# Patient Record
(demographics unavailable — no encounter records)

---

## 2025-04-16 NOTE — IMAGING
[No spinal deformity, fracture, lytic lesion, or marked single level collapse] : No spinal deformity, fracture, lytic lesion, or marked single level collapse [de-identified] : L lumbar paraspinal muscle tenderness L lumbar paraspinal muscle spasm   pain with flexion minimal pain with extension full ROM with mild stiffness diminished ROM in all planes   motor exam 5/5 strength bilaterally sensory intact +L SLR L L4/5 dermatome of pain   ambulates without assistive device non antalgic gait able to heel/toe walk      [FreeTextEntry1] : mild loss of disc height at L4-5, mild progression compared to last XR in 2023 [de-identified] : negative

## 2025-04-16 NOTE — HISTORY OF PRESENT ILLNESS
[Lower back] : lower back [6] : 6 [1] : 2 [Radiating] : radiating [Sharp] : sharp [Tightness] : tightness [Tingling] : tingling [Frequent] : frequent [Household chores] : household chores [Leisure] : leisure [Work] : work [Sleep] : sleep [Sitting] : sitting [Bending forward] : bending forward [Extending back] : extending back [Exercising] : exercising [de-identified] : 54 yo M with acute on chronic low back pain, worsening since Dec 2024 after bending down, felt a tightness. At the end of Feb starting having more pain into LLE - radiates into the L posterior leg to big toe. Tightness. Last seen by us in 2023- was prescribed PT and meds at that time which helped, since then has been doing HEP, nsaids without significant relief. Has been doing provider directed core strengthening and nsaids but still with symptoms. No prior injections or spinal surgeries.  MDP, tizanidine in the past helped. No prior MRIs.  [] : no

## 2025-04-16 NOTE — ASSESSMENT
[FreeTextEntry1] : 52 yo M with acute on chronic low back pain and new LLE radicular pain. XRS without fractures lesions or instability. New radicular complaints and symptoms have been persistent over the last 4+ months despite provider directed PT, nsaids. MRI LS indicated for further eval to guide treatment plan- likely pain mgmt referral for [facility] pending results given lack of improvement with PT/HEP and meds thus far  - Rx for tizanidine and mobic - PT/HEP - TPI L lumbar today tolerated well - f/u after MRI to review.   Patient seen by Florecita Garvey PA-C under the supervision of Clarence Hodges MD Progress note completed by Florecita Garvey PA-C acting as scribe

## 2025-05-05 NOTE — HISTORY OF PRESENT ILLNESS
[Lower back] : lower back [6] : 6 [1] : 2 [Radiating] : radiating [Sharp] : sharp [Tightness] : tightness [Tingling] : tingling [Frequent] : frequent [Household chores] : household chores [Leisure] : leisure [Work] : work [Sleep] : sleep [Sitting] : sitting [Bending forward] : bending forward [Extending back] : extending back [Exercising] : exercising [de-identified] : 5/5/25: MRI f/u. Continues to have LLE radicular symptoms. Notes mild relief from PT and meloxicam  4/16/25: 52 yo M with acute on chronic low back pain, worsening since Dec 2024 after bending down, felt a tightness. At the end of Feb starting having more pain into LLE - radiates into the L posterior leg to big toe. Tightness. Last seen by us in 2023- was prescribed PT and meds at that time which helped, since then has been doing HEP, nsaids without significant relief. Has been doing provider directed core strengthening and nsaids but still with symptoms. No prior injections or spinal surgeries.  MDP, tizanidine in the past helped. No prior MRIs.  [] : no

## 2025-05-05 NOTE — IMAGING
[No spinal deformity, fracture, lytic lesion, or marked single level collapse] : No spinal deformity, fracture, lytic lesion, or marked single level collapse [de-identified] : L lumbar paraspinal muscle tenderness L lumbar paraspinal muscle spasm   pain with flexion minimal pain with extension full ROM with mild stiffness diminished ROM in all planes   motor exam 5/5 strength bilaterally sensory intact +L SLR L L4/5 dermatome of pain   ambulates without assistive device non antalgic gait able to heel/toe walk      [FreeTextEntry1] : mild loss of disc height at L4-5, mild progression compared to last XR in 2023 [de-identified] : negative

## 2025-05-05 NOTE — ASSESSMENT
[FreeTextEntry1] : 52 yo M with acute on chronic low back pain and new LLE radicular pain. XRS without fractures lesions or instability. New radicular complaints and symptoms have been persistent over the last 5+ months despite provider directed PT, nsaids. 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 Discussed indication for surgery. Will have patient c/w another round of PT   -Patient will f/u with PCP to eval renal cyst -Renew meloxicam rx -C/w PT and focusing on core -Will consider PM if there's no relief from the next round of PT -f/u 6 weeks

## 2025-05-05 NOTE — DATA REVIEWED
[MRI] : MRI [Lumbar Spine] : lumbar spine [Report was reviewed and noted in the chart] : The report was reviewed and noted in the chart [I independently reviewed and interpreted images and report] : I independently reviewed and interpreted images and report [FreeTextEntry1] : L4/5 L HNP w/ NF stenosis

## 2025-06-19 NOTE — IMAGING
[No spinal deformity, fracture, lytic lesion, or marked single level collapse] : No spinal deformity, fracture, lytic lesion, or marked single level collapse [de-identified] : L lumbar paraspinal muscle tenderness L lumbar paraspinal muscle spasm   pain with flexion minimal pain with extension full ROM with mild stiffness diminished ROM in all planes   motor exam 5/5 strength bilaterally sensory intact +L SLR L L4/5 dermatome of pain   ambulates without assistive device non antalgic gait able to heel/toe walk      [FreeTextEntry1] : mild loss of disc height at L4-5, mild progression compared to last XR in 2023 [de-identified] : negative

## 2025-06-19 NOTE — ASSESSMENT
[FreeTextEntry1] : 52 yo M with acute on chronic low back pain and LLE radicular pain. XRS without fractures lesions or instability. New radicular complaints and symptoms have been persistent over the last 7+ months despite provider directed PT, nsaids. 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 Discussed indication for surgery. Will have patient c/w another round of PT    -C/w PT and focusing on core, new rx given -Will consider PM if there's no relief from the next round of PT -f/u 2 months

## 2025-06-19 NOTE — HISTORY OF PRESENT ILLNESS
[Lower back] : lower back [6] : 6 [1] : 2 [Radiating] : radiating [Sharp] : sharp [Tightness] : tightness [Tingling] : tingling [Frequent] : frequent [Household chores] : household chores [Leisure] : leisure [Work] : work [Sleep] : sleep [Sitting] : sitting [Bending forward] : bending forward [Extending back] : extending back [Exercising] : exercising [] : no [de-identified] : 6/19/25: f/u LS. Sxs improving with PT.  5/5/25: MRI f/u. Continues to have LLE radicular symptoms. Notes mild relief from PT and meloxicam  4/16/25: 54 yo M with acute on chronic low back pain, worsening since Dec 2024 after bending down, felt a tightness. At the end of Feb starting having more pain into LLE - radiates into the L posterior leg to big toe. Tightness. Last seen by us in 2023- was prescribed PT and meds at that time which helped, since then has been doing HEP, nsaids without significant relief. Has been doing provider directed core strengthening and nsaids but still with symptoms. No prior injections or spinal surgeries.  MDP, tizanidine in the past helped. No prior MRIs.

## 2025-06-19 NOTE — IMAGING
[No spinal deformity, fracture, lytic lesion, or marked single level collapse] : No spinal deformity, fracture, lytic lesion, or marked single level collapse [de-identified] : L lumbar paraspinal muscle tenderness L lumbar paraspinal muscle spasm   pain with flexion minimal pain with extension full ROM with mild stiffness diminished ROM in all planes   motor exam 5/5 strength bilaterally sensory intact +L SLR L L4/5 dermatome of pain   ambulates without assistive device non antalgic gait able to heel/toe walk      [FreeTextEntry1] : mild loss of disc height at L4-5, mild progression compared to last XR in 2023 [de-identified] : negative

## 2025-06-19 NOTE — HISTORY OF PRESENT ILLNESS
[Lower back] : lower back [6] : 6 [1] : 2 [Radiating] : radiating [Sharp] : sharp [Tightness] : tightness [Tingling] : tingling [Frequent] : frequent [Household chores] : household chores [Leisure] : leisure [Work] : work [Sleep] : sleep [Sitting] : sitting [Bending forward] : bending forward [Extending back] : extending back [Exercising] : exercising [] : no [de-identified] : 6/19/25: f/u LS. Sxs improving with PT.  5/5/25: MRI f/u. Continues to have LLE radicular symptoms. Notes mild relief from PT and meloxicam  4/16/25: 52 yo M with acute on chronic low back pain, worsening since Dec 2024 after bending down, felt a tightness. At the end of Feb starting having more pain into LLE - radiates into the L posterior leg to big toe. Tightness. Last seen by us in 2023- was prescribed PT and meds at that time which helped, since then has been doing HEP, nsaids without significant relief. Has been doing provider directed core strengthening and nsaids but still with symptoms. No prior injections or spinal surgeries.  MDP, tizanidine in the past helped. No prior MRIs.